# Patient Record
Sex: FEMALE
[De-identification: names, ages, dates, MRNs, and addresses within clinical notes are randomized per-mention and may not be internally consistent; named-entity substitution may affect disease eponyms.]

---

## 2022-10-21 ENCOUNTER — RX ONLY (OUTPATIENT)
Age: 57
Setting detail: RX ONLY
End: 2022-10-21

## 2022-10-25 ENCOUNTER — APPOINTMENT (RX ONLY)
Dept: URBAN - METROPOLITAN AREA CLINIC 15 | Facility: CLINIC | Age: 57
Setting detail: DERMATOLOGY
End: 2022-10-25

## 2022-10-25 DIAGNOSIS — Z41.9 ENCOUNTER FOR PROCEDURE FOR PURPOSES OTHER THAN REMEDYING HEALTH STATE, UNSPECIFIED: ICD-10-CM

## 2022-10-25 PROCEDURE — ? FILLERS

## 2022-10-25 PROCEDURE — ? COSMETIC CONSULTATION: BOTOX

## 2022-10-25 PROCEDURE — ? COSMETIC CONSULTATION: FILLERS

## 2022-10-25 PROCEDURE — ? INVENTORY

## 2022-10-25 NOTE — PROCEDURE: FILLERS
Tear Troughs Filler  Volume In Cc: 0
Include Cannula Brand?: [employer]
Map Statment: See [address] for Complete Details
Lot #: [id number]
Additional Area 1 Location: nose
Include Documentation That Aspiration Was Performed Prior To Injecting Filler:: No
Include Cannula Size?: 25G
Expiration Date (Month Year): 2021-03-21
Additional Area 2 Location: medial cheeks
Consent: Written consent obtained. Risks include but not limited to bruising, beading, irregular texture, ulceration, infection, allergic reaction, scar formation, incomplete augmentation, temporary nature, procedural pain.
Additional Area 2 Volume In Cc: 1
Filler: RHA 1
Include Cannula Length?: 1.5 inch
Additional Area 3 Location: chin
Aspiration Statement: Aspiration was performed prior to injecting site with filler.
Lot #: [id number]
Detail Level: Detailed
Additional Area 4 Location: neck lines
Additional Area 1 Location: lips
Post-Care Instructions: Patient instructed to apply ice to reduce swelling.
Additional Area 2 Location: perioral lines
Additional Area 5 Location: proximal thighs
Anesthesia Type: 1% lidocaine with epinephrine
Include Cannula Length?: 1 inch
Lot #: [id number]
Expiration Date (Month Year): 10/31/23
Additional Anesthesia Volume In Cc: 6